# Patient Record
Sex: OTHER/UNKNOWN | HISPANIC OR LATINO | Employment: PART TIME | URBAN - METROPOLITAN AREA
[De-identification: names, ages, dates, MRNs, and addresses within clinical notes are randomized per-mention and may not be internally consistent; named-entity substitution may affect disease eponyms.]

---

## 2023-11-09 ENCOUNTER — MEDICAL CORRESPONDENCE (OUTPATIENT)
Dept: HEALTH INFORMATION MANAGEMENT | Facility: CLINIC | Age: 68
End: 2023-11-09

## 2023-11-10 ENCOUNTER — TRANSCRIBE ORDERS (OUTPATIENT)
Dept: OTHER | Age: 68
End: 2023-11-10

## 2023-11-10 DIAGNOSIS — H18.509 CORNEAL DYSTROPHY: Primary | ICD-10-CM

## 2024-01-03 ENCOUNTER — OFFICE VISIT (OUTPATIENT)
Dept: OPHTHALMOLOGY | Facility: CLINIC | Age: 69
End: 2024-01-03
Attending: OPHTHALMOLOGY

## 2024-01-03 DIAGNOSIS — H59.013 PSEUDOPHAKIC BULLOUS KERATOPATHY OF BOTH EYES: ICD-10-CM

## 2024-01-03 DIAGNOSIS — H40.1132 PRIMARY OPEN ANGLE GLAUCOMA (POAG) OF BOTH EYES, MODERATE STAGE: ICD-10-CM

## 2024-01-03 DIAGNOSIS — H17.9 CORNEAL SCAR, RIGHT EYE: ICD-10-CM

## 2024-01-03 DIAGNOSIS — Z86.69 HISTORY OF RETINAL DETACHMENT: Primary | ICD-10-CM

## 2024-01-03 PROCEDURE — 99204 OFFICE O/P NEW MOD 45 MIN: CPT | Performed by: OPHTHALMOLOGY

## 2024-01-03 PROCEDURE — 99207 FUNDUS PHOTOS OU (BOTH EYES): CPT | Mod: 26 | Performed by: OPHTHALMOLOGY

## 2024-01-03 PROCEDURE — 99215 OFFICE O/P EST HI 40 MIN: CPT | Performed by: OPHTHALMOLOGY

## 2024-01-03 PROCEDURE — 76514 ECHO EXAM OF EYE THICKNESS: CPT | Performed by: OPHTHALMOLOGY

## 2024-01-03 PROCEDURE — 92133 CPTRZD OPH DX IMG PST SGM ON: CPT | Performed by: OPHTHALMOLOGY

## 2024-01-03 PROCEDURE — 92134 CPTRZ OPH DX IMG PST SGM RTA: CPT | Performed by: OPHTHALMOLOGY

## 2024-01-03 PROCEDURE — 92250 FUNDUS PHOTOGRAPHY W/I&R: CPT | Performed by: OPHTHALMOLOGY

## 2024-01-03 RX ORDER — MULTIVIT-MIN/IRON/FOLIC ACID/K 18-600-40
1 CAPSULE ORAL DAILY
COMMUNITY

## 2024-01-03 RX ORDER — ZINC GLUCONATE 50 MG
50 TABLET ORAL DAILY
COMMUNITY

## 2024-01-03 RX ORDER — SILICONE ADHESIVE 1.5" X 3"
1 SHEET (EA) TOPICAL EVERY 4 HOURS
COMMUNITY

## 2024-01-03 RX ORDER — VALSARTAN 80 MG/1
80 TABLET ORAL DAILY
COMMUNITY

## 2024-01-03 RX ORDER — LATANOPROST 50 UG/ML
1 SOLUTION/ DROPS OPHTHALMIC DAILY
COMMUNITY

## 2024-01-03 RX ORDER — MINERAL OIL AND PETROLATUM 150; 830 MG/G; MG/G
OINTMENT OPHTHALMIC
COMMUNITY

## 2024-01-03 RX ORDER — CALCIUM ACETATE 667 MG/1
667 CAPSULE ORAL DAILY
COMMUNITY

## 2024-01-03 ASSESSMENT — EXTERNAL EXAM - LEFT EYE: OS_EXAM: NORMAL

## 2024-01-03 ASSESSMENT — CONF VISUAL FIELD
OD_SUPERIOR_NASAL_RESTRICTION: 0
OD_INFERIOR_NASAL_RESTRICTION: 0
OD_INFERIOR_TEMPORAL_RESTRICTION: 0
OS_NORMAL: 1
METHOD: COUNTING FINGERS
OD_SUPERIOR_TEMPORAL_RESTRICTION: 0
OS_INFERIOR_TEMPORAL_RESTRICTION: 0
OS_SUPERIOR_TEMPORAL_RESTRICTION: 0
OS_INFERIOR_NASAL_RESTRICTION: 0
OS_SUPERIOR_NASAL_RESTRICTION: 0
OD_NORMAL: 1

## 2024-01-03 ASSESSMENT — PACHYMETRY
OD_CT(UM): 700
OS_CT(UM): 623

## 2024-01-03 ASSESSMENT — EXTERNAL EXAM - RIGHT EYE: OD_EXAM: NORMAL

## 2024-01-03 ASSESSMENT — VISUAL ACUITY
OD_PH_SC: 20/300
OS_SC: 20/250
OD_SC: 20/500
OS_PH_SC+: -1
METHOD: SNELLEN - LINEAR
OS_PH_SC: 20/125

## 2024-01-03 ASSESSMENT — TONOMETRY
OS_IOP_MMHG: 9
IOP_METHOD: ICARE
OD_IOP_MMHG: 14

## 2024-01-03 ASSESSMENT — CUP TO DISC RATIO
OD_RATIO: 0.8
OS_RATIO: 0.8

## 2024-01-03 ASSESSMENT — SLIT LAMP EXAM - LIDS
COMMENTS: NORMAL
COMMENTS: NORMAL

## 2024-01-03 NOTE — NURSING NOTE
Chief Complaints and History of Present Illnesses   Patient presents with    Corneal Evaluation     Refer by Mark Villanueva for Corneal dystrophy      Chief Complaint(s) and History of Present Illness(es)       Corneal Evaluation              Comments: Refer by Mark Villanueva for Corneal dystrophy               Comments    Patient mentions having difficulty with near vision but is stable and sometimes having difficulty with night glare while driving - does not wear glasses .Patient denies light sensitivity or eye pain. Patient complains of dry eyes.     Patient uses artificial tears 4x a day, Maci 4x a day, and Latanoprost at bedtime Both eyes.    Carmel Colon  9:10 AM January 3, 2024

## 2024-01-03 NOTE — PROGRESS NOTES
Chief complaint   Blurry vision     HPI    Vladimir Maria 68 year old adult       Chief Complaint(s) and History of Present Illness(es)       Corneal Evaluation     Additional comments: Refer by Mark Villanueva for Corneal dystrophy              Comments    Patient mentions having difficulty with near vision but is stable and sometimes having difficulty with night glare while driving - does not wear glasses .Patient denies light sensitivity or eye pain. Patient complains of dry eyes.     Patient uses artificial tears 4x a day, Maci 4x a day, and Latanoprost at bedtime Both eyes.    Carmel Colon  9:10 AM January 3, 2024                         Past ocular history   Prior eye surgery/laser/Trauma: phaco IOL each eye [id number]  CTL wearer:No  Glasses : -  Family Hx of eye disease: -    PMH     Past Medical History:   Diagnosis Date    Hypertension        PSH     Past Surgical History:   Procedure Laterality Date    CATARACT IOL, RT/LT Bilateral 2001    GALLBLADDER SURGERY  1987    REPAIR PTOSIS Bilateral     Upper eyelid       Meds     Current Outpatient Medications   Medication    Ascorbic Acid (VITAMIN C) 500 MG CAPS    calcium acetate (PHOSLO) 667 MG CAPS capsule    latanoprost (XALATAN) 0.005 % ophthalmic solution    sodium chloride (MACI 128) 5 % ophthalmic solution    valsartan (DIOVAN) 80 MG tablet    vitamin B complex with vitamin C (VITAMIN  B COMPLEX) tablet    White Petrolatum-Mineral Oil (ARTIFICIAL TEARS) 83-15 % OINT    zinc gluconate 50 MG tablet     No current facility-administered medications for this visit.       Labs   -    Imaging   -    Drops Currently Taking   artificial tears 4x a day, Maci 4x a day, and Latanoprost at bedtime Both eyes.    Assessment/Plan   # pseudophakia each eye  -complex right eye with correctopia superonasal  -Complicated with retinal detachment OS  -Complicated with corneal scar right eye?    #corneal scar right eye  Visually significant covering the visual  axis    #bullous k OS  Thick pachy and visually significant    #Glaucoma open angle each eye  -OCT RNFL is good   C/D 0.8  Does not seem a limiting factor to performing cataract surgery    Plan:    Right eye:  Patient need an iris repair and corneal transplant. Glaucoma status is unknown.  Discussed Risks, benefits and alternatives with the patients. Discussed risk of bleeding, infection, loosing vision.told patient may still need glasses or CL for vision recovery after       Left eye:  Patient needs a DSEAK to improve corneal edema. IOL seems in good place.  - Discussed R/B/A for DSEAK in the left eye including infection, detachment of graft, failure.Told her she will need to stay on the back for few days and there might be a need for reinjection of gaz     Patient would like more time to think about the procedure  Surgeries need to be placed 6 months apart.      Follow up:  Oph: 3 months  Pachymetry - Interpretation & Report  Indication:  Performed by: Shaan Krishnan MD  Reliability: good  Patient cooperation: good  Findings:   Right eye:  700 micrometers centrally    Left eye:  623 micrometers centrally   Interval Change, Assessment, & Impact on treatment:   Right eye:  elevated   Left eye:  elevated  Signed: Shaan Krishnan M.D     Attending Physician Attestation:  Complete documentation of historical and exam elements from today's encounter can be found in the full encounter summary report (not reduplicated in this progress note).  I personally obtained the chief complaint(s) and history of present illness.  I confirmed and edited as necessary the review of systems, past medical/surgical history, family history, social history, and examination findings as documented by others; and I examined the patient myself.  I personally reviewed the relevant tests, images, and reports as documented above.  I formulated and edited as necessary the assessment and plan and discussed the findings and management plan with the  patient and family. - Shaan Krishnan MD